# Patient Record
Sex: FEMALE | Race: WHITE | Employment: FULL TIME | URBAN - METROPOLITAN AREA
[De-identification: names, ages, dates, MRNs, and addresses within clinical notes are randomized per-mention and may not be internally consistent; named-entity substitution may affect disease eponyms.]

---

## 2017-01-09 ENCOUNTER — OP HISTORICAL/CONVERTED ENCOUNTER (OUTPATIENT)
Dept: OTHER | Age: 57
End: 2017-01-09

## 2017-03-16 ENCOUNTER — ED HISTORICAL/CONVERTED ENCOUNTER (OUTPATIENT)
Dept: OTHER | Age: 57
End: 2017-03-16

## 2019-09-27 ENCOUNTER — OP HISTORICAL/CONVERTED ENCOUNTER (OUTPATIENT)
Dept: OTHER | Age: 59
End: 2019-09-27

## 2019-10-11 ENCOUNTER — OP HISTORICAL/CONVERTED ENCOUNTER (OUTPATIENT)
Dept: OTHER | Age: 59
End: 2019-10-11

## 2019-11-01 ENCOUNTER — OP HISTORICAL/CONVERTED ENCOUNTER (OUTPATIENT)
Dept: OTHER | Age: 59
End: 2019-11-01

## 2020-12-15 ENCOUNTER — HOSPITAL ENCOUNTER (OUTPATIENT)
Dept: RADIATION THERAPY | Age: 60
Discharge: HOME | End: 2020-12-15

## 2020-12-16 ENCOUNTER — HOSPITAL ENCOUNTER (OUTPATIENT)
Dept: RADIATION THERAPY | Age: 60
Discharge: HOME | End: 2020-12-16

## 2021-12-14 ENCOUNTER — HOSPITAL ENCOUNTER (OUTPATIENT)
Dept: RADIATION THERAPY | Age: 61
Discharge: HOME | End: 2021-12-14

## 2022-07-14 ENCOUNTER — TRANSCRIBE ORDER (OUTPATIENT)
Dept: SCHEDULING | Age: 62
End: 2022-07-14

## 2022-07-14 DIAGNOSIS — C50.112 MALIGNANT NEOPLASM OF CENTRAL PORTION OF LEFT FEMALE BREAST (HCC): ICD-10-CM

## 2022-07-14 DIAGNOSIS — Z12.31 VISIT FOR SCREENING MAMMOGRAM: Primary | ICD-10-CM

## 2022-08-06 ENCOUNTER — HOSPITAL ENCOUNTER (OUTPATIENT)
Dept: MAMMOGRAPHY | Age: 62
Discharge: HOME | End: 2022-08-06
Attending: INTERNAL MEDICINE
Payer: COMMERCIAL

## 2022-08-06 DIAGNOSIS — C50.112 MALIGNANT NEOPLASM OF CENTRAL PORTION OF LEFT FEMALE BREAST (HCC): ICD-10-CM

## 2022-08-06 DIAGNOSIS — Z12.31 VISIT FOR SCREENING MAMMOGRAM: ICD-10-CM

## 2022-08-06 PROCEDURE — 77067 SCR MAMMO BI INCL CAD: CPT

## 2022-08-31 ENCOUNTER — TRANSCRIBE ORDER (OUTPATIENT)
Dept: SCHEDULING | Age: 62
End: 2022-08-31

## 2022-08-31 DIAGNOSIS — C50.112 CANCER OF CENTRAL PORTION OF LEFT BREAST (HCC): ICD-10-CM

## 2022-08-31 DIAGNOSIS — N63.10 MASS OF RIGHT BREAST: Primary | ICD-10-CM

## 2022-09-07 ENCOUNTER — TRANSCRIBE ORDER (OUTPATIENT)
Dept: SCHEDULING | Age: 62
End: 2022-09-07

## 2022-09-07 DIAGNOSIS — C50.112 MALIGNANT NEOPLASM OF CENTRAL PORTION OF LEFT FEMALE BREAST (HCC): Primary | ICD-10-CM

## 2022-09-22 ENCOUNTER — HOSPITAL ENCOUNTER (OUTPATIENT)
Dept: ULTRASOUND IMAGING | Age: 62
Discharge: HOME | End: 2022-09-22
Attending: INTERNAL MEDICINE
Payer: COMMERCIAL

## 2022-09-22 ENCOUNTER — HOSPITAL ENCOUNTER (OUTPATIENT)
Dept: MAMMOGRAPHY | Age: 62
Discharge: HOME | End: 2022-09-22
Attending: INTERNAL MEDICINE
Payer: COMMERCIAL

## 2022-09-22 DIAGNOSIS — N63.10 MASS OF RIGHT BREAST: ICD-10-CM

## 2022-09-22 DIAGNOSIS — C50.112 MALIGNANT NEOPLASM OF CENTRAL PORTION OF LEFT FEMALE BREAST (HCC): ICD-10-CM

## 2022-09-22 DIAGNOSIS — C50.112 CANCER OF CENTRAL PORTION OF LEFT BREAST (HCC): ICD-10-CM

## 2022-09-22 PROCEDURE — 76642 ULTRASOUND BREAST LIMITED: CPT

## 2023-02-01 DIAGNOSIS — Z12.31 VISIT FOR SCREENING MAMMOGRAM: Primary | ICD-10-CM

## 2023-02-03 DIAGNOSIS — Z12.31 VISIT FOR SCREENING MAMMOGRAM: Primary | ICD-10-CM

## 2023-02-08 ENCOUNTER — OFFICE VISIT (OUTPATIENT)
Dept: SURGERY | Age: 63
End: 2023-02-08
Payer: COMMERCIAL

## 2023-02-08 VITALS
OXYGEN SATURATION: 98 % | BODY MASS INDEX: 55.32 KG/M2 | HEART RATE: 102 BPM | SYSTOLIC BLOOD PRESSURE: 150 MMHG | HEIGHT: 61 IN | RESPIRATION RATE: 20 BRPM | DIASTOLIC BLOOD PRESSURE: 76 MMHG | TEMPERATURE: 98 F | WEIGHT: 293 LBS

## 2023-02-08 DIAGNOSIS — Z85.3 HISTORY OF LEFT BREAST CANCER: Primary | ICD-10-CM

## 2023-02-08 DIAGNOSIS — Z85.3 HISTORY OF RIGHT BREAST CANCER: ICD-10-CM

## 2023-02-08 PROCEDURE — 99203 OFFICE O/P NEW LOW 30 MIN: CPT | Performed by: SURGERY

## 2023-02-08 RX ORDER — FEXOFENADINE HCL AND PSEUDOEPHEDRINE HCI 60; 120 MG/1; MG/1
1 TABLET, EXTENDED RELEASE ORAL EVERY 12 HOURS
COMMUNITY

## 2023-02-08 RX ORDER — CALCIUM CARBONATE/VITAMIN D3 600 MG-125
TABLET ORAL
COMMUNITY

## 2023-02-08 RX ORDER — FUROSEMIDE 40 MG/1
40 TABLET ORAL
COMMUNITY
Start: 2022-12-15

## 2023-02-08 RX ORDER — FAMOTIDINE 20 MG/1
20 TABLET, FILM COATED ORAL 2 TIMES DAILY
COMMUNITY

## 2023-02-08 RX ORDER — FOLIC ACID 1 MG/1
1 TABLET ORAL DAILY
COMMUNITY
Start: 2023-01-07

## 2023-02-08 RX ORDER — ANASTROZOLE 1 MG/1
1 TABLET ORAL DAILY
COMMUNITY
Start: 2023-01-07

## 2023-02-08 RX ORDER — METHOTREXATE 2.5 MG/1
TABLET ORAL
COMMUNITY
Start: 2023-02-05

## 2023-02-08 NOTE — PROGRESS NOTES
CC:   Chief Complaint   Patient presents with    Advice Only     History right breast cancer 2019 left breast cancer 2016        Assessment:    ICD-10-CM ICD-9-CM    1. History of left breast cancer  Z85.3 V10.3       2. History of right breast cancer  Z85.3 V10.3           Plan: She is due for right mammogram in Septemeber 2023. We will schedule this for her. Encouraged to do self breast exams monthly and notify us of any changes to her breast. I also encouraged her to monitor her skin and chest wall on the side of mastectomy as well. Continue with Arimidex 1 mg daily under the instruction of [place] [facility] Dr. Kevin Fine. Follow up in 6 months or sooner should she have any questions or concerns. She agrees with this plan. 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 In 2016 she underwent left mastectomy and left sentinel lymph node biopsy she received adjuvant TCH until February 2017 followed by adjuvant Herceptin on March 2017 until September 2017. In August 2019 she was diagnosed with right breast cancer and underwent lumpectomy followed by adjuvant radiation and endocrine therapy with anastrozole since December 2019. Her most recent mammogram in September 2022 on the right side was BI-RADS 2 benign. Overall she is feeling well today with no complaints. Allergies: Allergies   Allergen Reactions    Amoxicillin Rash       Medication Review:  Current Outpatient Medications on File Prior to Visit   Medication Sig Dispense Refill    anastrozole (ARIMIDEX) 1 mg tablet Take 1 mg by mouth daily. folic acid (FOLVITE) 1 mg tablet Take 1 mg by mouth daily. furosemide (LASIX) 40 mg tablet Take 40 mg by mouth daily as needed. methotrexate (RHEUMATREX) 2.5 mg tablet       calcium-cholecalciferol, d3, (CALCIUM 600 + D) 600-125 mg-unit tab Take  by mouth.       famotidine (Pepcid) 20 mg tablet Take 20 mg by mouth two (2) times a day. fexofenadine-pseudoephedrine (Allegra-D 12 Hour)  mg Tb12 Take 1 Tablet by mouth every twelve (12) hours. No current facility-administered medications on file prior to visit. Systems Review:  Review of Systems   Constitutional:  Negative for fatigue, fever and unexpected weight change. Respiratory:  Negative for chest tightness and shortness of breath. Cardiovascular:  Negative for chest pain and palpitations. Skin:  Negative for pallor, rash and wound. Hematological:  Negative for adenopathy. Does not bruise/bleed easily. PMH:  Past Medical History:   Diagnosis Date    Cancer ([address]     right and left       Surgical History:  Past Surgical History:   Procedure Laterality Date    HX CHOLECYSTECTOMY      HX ORTHOPAEDIC      right sand left knee replacement    WI UNLISTED PROCEDURE BREAST      mastectomy left    WI UNLISTED PROCEDURE BREAST Right     excisional biopsy       Social History:  Social History     Socioeconomic History    Marital status: SINGLE   Tobacco Use    Smoking status: Never    Smokeless tobacco: Never   Substance and Sexual Activity    Alcohol use: Yes     Comment: rare    Drug use: Not Currently       Family History:  History reviewed. No pertinent family history. No visits with results within 3 Month(s) from this visit. Latest known visit with results is:   No results found for any previous visit. US BREAST RT LIMITED=<3 QUAD  Narrative: EXAM: Focused ultrasound of the Right Breast    INDICATION: Right breast mass    TECHNIQUE: Focused ultrasound of the right breast performed. COMPARISON: 8/6/2022    FINDINGS: Scanning was performed by the radiologist and sonographer. In   the  right axilla, there is a benign-appearing lymph node measuring 2.9 x 1.1 x   0.9  cm with a maximal cortex of 0.25 cm. Normal fatty hilum is noted. Impression: 1. Benign-appearing right axillary lymph node.  No further workup necessary. Resume annual screening mammography. BI-RADS category: 2 - Benign     These findings were discussed with the patient in person. Indications to   return  sooner were discussed with the patient such as new palpable nodule, nipple  inversion, spontaneous nipple discharge especially if bloody, and   architectural  changes. Patient demonstrated understanding. Physical Exam:  Visit Vitals  BP (!) 150/76   Pulse (!) 102   Temp 98 °F (36.7 °C)   Resp 20   Ht 5' 1\" (1.549 m)   Wt 135.2 kg (298 lb)   SpO2 98%   BMI 56.31 kg/m²    BMI: Body mass index is 56.31 kg/m². Physical Exam  Constitutional:       Appearance: She is obese. HENT:      Head: Normocephalic and atraumatic. Cardiovascular:      Rate and Rhythm: Tachycardia present. Chest:   Breasts:     Right: Normal. No swelling, bleeding, inverted nipple, mass, nipple discharge, skin change or tenderness. Left: Absent. No skin change or tenderness. Lymphadenopathy:      Upper Body:      Right upper body: No supraclavicular, axillary or pectoral adenopathy. Left upper body: No supraclavicular, axillary or pectoral adenopathy. Skin:     General: Skin is warm and dry. Neurological:      General: No focal deficit present. Mental Status: She is alert and oriented to person, place, and time. Mental status is at baseline. Psychiatric:         Mood and Affect: Mood normal.         Behavior: Behavior normal.         Thought Content: Thought content normal.         Judgment: Judgment normal.       I have reviewed the information entered by the clinical staff and/or patient and verified it as accurate or edited where necessary.      Electronically signed by:    Gabriella Champion DO, MPH

## 2023-02-08 NOTE — LETTER
2/8/2023    Patient: Cecile Swan   YOB: 1960   Date of Visit: 2/8/2023     Jed Sky MD  [phone]  Via Fax: [phone]    Dear Jed Sky MD,      Thank you for referring Ms. Guille Lara to [address] for evaluation. My notes for this consultation are attached. If you have questions, please do not hesitate to call me. I look forward to following your patient along with you.       Sincerely,    Efrain Leal, [id number]

## 2023-09-14 ENCOUNTER — HOSPITAL ENCOUNTER (OUTPATIENT)
Facility: HOSPITAL | Age: 63
End: 2023-09-14
Attending: INTERNAL MEDICINE
Payer: COMMERCIAL

## 2023-09-14 ENCOUNTER — HOSPITAL ENCOUNTER (OUTPATIENT)
Facility: HOSPITAL | Age: 63
Discharge: HOME | End: 2023-09-14
Attending: INTERNAL MEDICINE
Payer: COMMERCIAL

## 2023-09-14 VITALS — BODY MASS INDEX: 55.32 KG/M2 | HEIGHT: 61 IN | WEIGHT: 293 LBS

## 2023-09-14 DIAGNOSIS — N95.1 MENOPAUSE SYNDROME: ICD-10-CM

## 2023-09-14 DIAGNOSIS — C50.112 MALIGNANT NEOPLASM OF CENTRAL PORTION OF LEFT FEMALE BREAST, UNSPECIFIED ESTROGEN RECEPTOR STATUS (HCC): ICD-10-CM

## 2023-09-14 PROCEDURE — 77065 DX MAMMO INCL CAD UNI: CPT

## 2023-12-29 ENCOUNTER — HOSPITAL ENCOUNTER (OUTPATIENT)
Facility: HOSPITAL | Age: 63
Discharge: HOME | End: 2023-12-29
Attending: INTERNAL MEDICINE
Payer: COMMERCIAL

## 2023-12-29 DIAGNOSIS — C50.112 MALIGNANT NEOPLASM OF CENTRAL PORTION OF LEFT FEMALE BREAST, UNSPECIFIED ESTROGEN RECEPTOR STATUS (HCC): ICD-10-CM

## 2023-12-29 DIAGNOSIS — N95.1 MENOPAUSE SYNDROME: ICD-10-CM

## 2023-12-29 PROCEDURE — 77080 DXA BONE DENSITY AXIAL: CPT

## 2024-08-28 ENCOUNTER — TRANSCRIBE ORDERS (OUTPATIENT)
Dept: SCHEDULING | Age: 64
End: 2024-08-28

## 2024-08-28 DIAGNOSIS — Z12.31 SCREENING MAMMOGRAM FOR HIGH-RISK PATIENT: Primary | ICD-10-CM

## 2024-10-11 ENCOUNTER — HOSPITAL ENCOUNTER (OUTPATIENT)
Facility: HOSPITAL | Age: 64
Discharge: HOME | End: 2024-10-14
Payer: COMMERCIAL

## 2024-10-11 DIAGNOSIS — Z12.31 VISIT FOR SCREENING MAMMOGRAM: ICD-10-CM

## 2024-10-11 PROCEDURE — 77063 BREAST TOMOSYNTHESIS BI: CPT

## 2025-08-08 ENCOUNTER — TRANSCRIBE ORDERS (OUTPATIENT)
Facility: HOSPITAL | Age: 65
End: 2025-08-08

## 2025-08-08 DIAGNOSIS — Z12.31 VISIT FOR SCREENING MAMMOGRAM: Primary | ICD-10-CM
